# Patient Record
Sex: FEMALE | URBAN - METROPOLITAN AREA
[De-identification: names, ages, dates, MRNs, and addresses within clinical notes are randomized per-mention and may not be internally consistent; named-entity substitution may affect disease eponyms.]

---

## 2020-02-09 ENCOUNTER — NURSE TRIAGE (OUTPATIENT)
Dept: ADMINISTRATIVE | Facility: CLINIC | Age: 75
End: 2020-02-09

## 2020-02-09 NOTE — TELEPHONE ENCOUNTER
Reason for Disposition   [1] Numbness (i.e., loss of sensation) of the face, arm / hand, or leg / foot on one side of the body AND [2] gradual onset (e.g., days to weeks) AND [3] present now    Additional Information   Negative: [1] SEVERE weakness (i.e., unable to walk or barely able to walk, requires support) AND [2] new onset or worsening   Negative: [1] Weakness (i.e., paralysis, loss of muscle strength) of the face, arm / hand, or leg / foot on one side of the body AND [2] sudden onset AND [3] present now   Negative: [1] Numbness (i.e., loss of sensation) of the face, arm / hand, or leg / foot on one side of the body AND [2] sudden onset AND [3] present now   Negative: [1] Loss of speech or garbled speech AND [2] sudden onset AND [3] present now   Negative: Difficult to awaken or acting confused (e.g., disoriented, slurred speech)   Negative: Sounds like a life-threatening emergency to the triager   Negative: Confusion, disorientation, or hallucinations is main symptom   Negative: Neck pain is main symptom (and having weakness, numbness, or tingling in arm / hand because of neck pain)   Negative: Back pain is main symptom (and having weakness, numbness, or tingling in leg because of back pain)   Negative: Hand pain is main symptom (and having mild weakness, numbness, or tingling in hand related to hand pain)   Negative: Dizziness is main symptom   Negative: Vision loss or change is main symptom   Negative: Followed a head injury within last 3 days   Negative: Followed a neck injury within last 3 days   Negative: [1] Tingling in both hands and/or feet AND [2] breathing faster than normal AND [3] feels similar to prior panic attack or hyperventilation episode   Negative: Weakness in both sides of the body or weakness all over   Negative: Headache  (and neurologic deficit)   Negative: [1] Back pain AND [2] numbness (loss of sensation) in groin or rectal area   Negative: [1] Unable to  urinate (or only a few drops) > 4 hours AND [2] bladder feels very full (e.g., palpable bladder or strong urge to urinate)   Negative: [1] Loss of control of bowel or bladder (i.e., incontinence) AND [2] new onset   Negative: [1] Weakness (i.e., paralysis, loss of muscle strength) of the face, arm / hand, or leg / foot on one side of the body AND [2] sudden onset AND [3] brief (now gone)   Negative: [1] Numbness (i.e., loss of sensation) of the face, arm / hand, or leg / foot on one side of the body AND [2] sudden onset AND [3] brief (now gone)   Negative: [1] Loss of speech or garbled speech AND [2] sudden onset AND [3] brief (now gone)   Negative: Bell's palsy suspected (i.e., weakness on only one side of the face, developing over hours to days, no other symptoms)   Negative: Patient sounds very sick or weak to the triager   Negative: Neck pain  (and neurologic deficit)   Negative: Back pain  (and neurologic deficit)   Negative: [1] Weakness of the face, arm / hand, or leg / foot on one side of the body AND [2] gradual onset (e.g., days to weeks) AND [3] present now    Protocols used: NEUROLOGIC DEFICIT-A-AH    Pt stated she has been having weakness and numbness in her right arm off and on since December. Stated it happens more at night. Pt stated it started in her thumb, and now its up her entire arm. Per triage protocol, advised to see a Physician within 4 hours. Pt verbalized understanding.